# Patient Record
Sex: MALE | Race: WHITE
[De-identification: names, ages, dates, MRNs, and addresses within clinical notes are randomized per-mention and may not be internally consistent; named-entity substitution may affect disease eponyms.]

---

## 2018-02-12 ENCOUNTER — HOSPITAL ENCOUNTER (OUTPATIENT)
Dept: HOSPITAL 95 - ATC | Age: 73
Discharge: HOME | End: 2018-02-12
Payer: MEDICARE

## 2018-02-12 DIAGNOSIS — I35.9: Primary | ICD-10-CM

## 2018-02-13 ENCOUNTER — HOSPITAL ENCOUNTER (OUTPATIENT)
Dept: HOSPITAL 95 - ATC | Age: 73
Discharge: HOME | End: 2018-02-13
Payer: MEDICARE

## 2018-02-13 DIAGNOSIS — Z88.5: ICD-10-CM

## 2018-02-13 DIAGNOSIS — I35.9: Primary | ICD-10-CM

## 2018-02-14 ENCOUNTER — HOSPITAL ENCOUNTER (OUTPATIENT)
Dept: HOSPITAL 95 - ATC | Age: 73
Discharge: HOME | End: 2018-02-14
Payer: MEDICARE

## 2018-02-14 DIAGNOSIS — I35.9: Primary | ICD-10-CM

## 2018-02-14 DIAGNOSIS — E78.5: ICD-10-CM

## 2018-02-14 DIAGNOSIS — I10: ICD-10-CM

## 2018-02-16 ENCOUNTER — HOSPITAL ENCOUNTER (OUTPATIENT)
Dept: HOSPITAL 95 - ATC | Age: 73
Discharge: HOME | End: 2018-02-16
Payer: MEDICARE

## 2018-02-16 DIAGNOSIS — I35.9: Primary | ICD-10-CM

## 2018-02-16 DIAGNOSIS — K21.9: ICD-10-CM

## 2018-02-16 DIAGNOSIS — E55.9: ICD-10-CM

## 2018-02-16 DIAGNOSIS — I10: ICD-10-CM

## 2018-02-17 ENCOUNTER — HOSPITAL ENCOUNTER (OUTPATIENT)
Dept: HOSPITAL 95 - ATC | Age: 73
Discharge: HOME | End: 2018-02-17
Payer: MEDICARE

## 2018-02-17 DIAGNOSIS — I35.9: Primary | ICD-10-CM

## 2018-02-17 DIAGNOSIS — K21.9: ICD-10-CM

## 2018-02-17 DIAGNOSIS — G47.33: ICD-10-CM

## 2018-02-17 DIAGNOSIS — Z86.73: ICD-10-CM

## 2018-02-17 DIAGNOSIS — E78.5: ICD-10-CM

## 2018-02-18 ENCOUNTER — HOSPITAL ENCOUNTER (OUTPATIENT)
Dept: HOSPITAL 95 - ATC | Age: 73
Discharge: HOME | End: 2018-02-18
Payer: MEDICARE

## 2018-02-18 DIAGNOSIS — E78.5: ICD-10-CM

## 2018-02-18 DIAGNOSIS — G47.33: ICD-10-CM

## 2018-02-18 DIAGNOSIS — I10: ICD-10-CM

## 2018-02-18 DIAGNOSIS — K21.9: ICD-10-CM

## 2018-02-18 DIAGNOSIS — I35.9: Primary | ICD-10-CM

## 2018-02-19 ENCOUNTER — HOSPITAL ENCOUNTER (OUTPATIENT)
Dept: HOSPITAL 95 - ATC | Age: 73
Discharge: HOME | End: 2018-02-19
Payer: MEDICARE

## 2018-02-19 DIAGNOSIS — E78.5: ICD-10-CM

## 2018-02-19 DIAGNOSIS — I35.9: Primary | ICD-10-CM

## 2018-02-19 DIAGNOSIS — K21.9: ICD-10-CM

## 2018-02-19 DIAGNOSIS — G47.33: ICD-10-CM

## 2018-02-19 DIAGNOSIS — I10: ICD-10-CM

## 2018-02-20 ENCOUNTER — HOSPITAL ENCOUNTER (OUTPATIENT)
Dept: HOSPITAL 95 - ATC | Age: 73
Discharge: HOME | End: 2018-02-20
Payer: MEDICARE

## 2018-02-20 DIAGNOSIS — E78.5: ICD-10-CM

## 2018-02-20 DIAGNOSIS — I10: ICD-10-CM

## 2018-02-20 DIAGNOSIS — G47.33: ICD-10-CM

## 2018-02-20 DIAGNOSIS — K21.9: ICD-10-CM

## 2018-02-20 DIAGNOSIS — I35.9: Primary | ICD-10-CM

## 2018-02-21 ENCOUNTER — HOSPITAL ENCOUNTER (OUTPATIENT)
Dept: HOSPITAL 95 - ATC | Age: 73
Discharge: HOME | End: 2018-02-21
Payer: MEDICARE

## 2018-02-21 DIAGNOSIS — I10: ICD-10-CM

## 2018-02-21 DIAGNOSIS — I35.9: Primary | ICD-10-CM

## 2018-02-21 DIAGNOSIS — E78.5: ICD-10-CM

## 2018-02-21 DIAGNOSIS — K21.9: ICD-10-CM

## 2018-02-21 DIAGNOSIS — G47.33: ICD-10-CM

## 2018-02-22 ENCOUNTER — HOSPITAL ENCOUNTER (OUTPATIENT)
Dept: HOSPITAL 95 - ATC | Age: 73
Discharge: HOME | End: 2018-02-22
Payer: MEDICARE

## 2018-02-22 DIAGNOSIS — I35.9: Primary | ICD-10-CM

## 2018-02-22 DIAGNOSIS — Z79.01: ICD-10-CM

## 2019-01-25 ENCOUNTER — HOSPITAL ENCOUNTER (OUTPATIENT)
Dept: HOSPITAL 95 - LAB EV | Age: 74
Discharge: HOME | End: 2019-01-25
Attending: PHYSICIAN ASSISTANT
Payer: MEDICARE

## 2019-01-25 DIAGNOSIS — Z51.81: ICD-10-CM

## 2019-01-25 DIAGNOSIS — Z79.01: Primary | ICD-10-CM

## 2019-01-25 LAB — PROTHROMBIN TIME: 24.5 SEC (ref 9.7–11.5)

## 2020-10-14 NOTE — NUR
PERFORMED PROCEDURE IN RADIOLOGY WITH RN GAVE NITRO CHARTED ON PAPERWORK  AND
RETOOK VSS CHARTED ON PAPERWORK. PROCEDURE COMPLETED AND IV REMOVED AND
DISCHARGE INSTRUCTIONS GONE OVER WITH AND PATIENT DISCHARGED FROM RADIOLOGY
WITH ALL BELONGINGS

## 2021-07-06 NOTE — NUR
DISACHARGE
PT REMAINED A&OX3 AND DENIED ANY PAIN DURING RECOVERY. R RADIAL SITE-TR BAND
REMOVED-CLOTH DOT, WHITE BOARD AND SLING IN PLACE.-CDI NO HEMATOMA NOTED. IV
DC'D WITH CANULA IN TACT. PT ABLE TO DRESS SELF WITH LITTLE HELP. DISCHARGE
PAPERWORK GONE OVER WITH PT AND SPOUSE. PT AND SPOUSE VERBALLY STATED THE
UNDERSTANDING OF THE DISCHARGE EDUCATION AND DENIED ANY QUESTIONS AT THIS
TIME. PT WHEELD OUT BY THIS NURSE.

## 2021-12-21 NOTE — NUR
12/21/21 1606 BETH PAVON
PT C/O NAUSEA AFTER HAVEN GIVEN REGLAN- O2 SATS DROP TO 89%
 WHEN OFF OF
O2.- DR CANNON GAVE ORDERS FOR PHENEGRAN 25MG AND SCOPOLAMINE PATCH
AND A DUONEB BREATHING TX.
SEE EMAR
PT GIVEN INCENTIVE SPIROMETER AND INSTRUCTED AND WATCHED HIM PERFORM
IT.
PHENEGRAN SEDATED PT AND O2 SATS DROP UNDER 90%. SPOUSE IS COACHING PT
TO STAY AWAKE AND DO INCENTIVE SPIROMETER. COMES UP TO 93% SPOUSE SAYS
PT HAS OBSTRUCTIVE SLEEP APNEA BUT REFUSED TO WEAR HIS MASK. SHE SAYS
HIS OXYGEN SATS DROP WHEN HE SLEEPS AT HOME.

## 2021-12-21 NOTE — NUR
12/21/21 1201 JESUS SANCHEZ
TIMEOUT COMPLETED FOR INTRASCALENE NERVE BLOCK.
PT WAS MONITORED WITH PULSE OX DURING PROCEDURE. TOLERATED
PROCEDURE WELL.

## 2024-12-03 NOTE — NUR
Pt back to recovery room. Pt reminded to keep head and legs down. Pt given
sips of soda per request. Updated on plan of care. L groin is soft and no
bleeding or hematoma.

## 2024-12-03 NOTE — NUR
LINQ SITE SOFT NON-TENDER WITH NO HEMATOMA, NO BLEEDING AND INTACT DRESSING.
DISCHARGE INSTRUCTIONS REVIEWED ALL QUESTIONS ANSWERED. PT AMBULATED OUT.